# Patient Record
Sex: MALE | Race: WHITE | Employment: PART TIME | URBAN - NONMETROPOLITAN AREA
[De-identification: names, ages, dates, MRNs, and addresses within clinical notes are randomized per-mention and may not be internally consistent; named-entity substitution may affect disease eponyms.]

---

## 2023-01-28 ENCOUNTER — HOSPITAL ENCOUNTER (EMERGENCY)
Age: 28
Discharge: HOME | End: 2023-01-28
Attending: EMERGENCY MEDICINE
Payer: COMMERCIAL

## 2023-01-28 VITALS
OXYGEN SATURATION: 97 % | HEIGHT: 67 IN | BODY MASS INDEX: 25.9 KG/M2 | SYSTOLIC BLOOD PRESSURE: 130 MMHG | WEIGHT: 165 LBS | HEART RATE: 70 BPM | DIASTOLIC BLOOD PRESSURE: 75 MMHG | TEMPERATURE: 98.2 F | RESPIRATION RATE: 16 BRPM

## 2023-01-28 DIAGNOSIS — T50.905A ADVERSE EFFECT OF DRUG, INITIAL ENCOUNTER: Primary | ICD-10-CM

## 2023-01-28 DIAGNOSIS — F41.1 ANXIETY STATE: ICD-10-CM

## 2023-01-28 PROCEDURE — 6370000000 HC RX 637 (ALT 250 FOR IP): Performed by: EMERGENCY MEDICINE

## 2023-01-28 PROCEDURE — 99283 EMERGENCY DEPT VISIT LOW MDM: CPT

## 2023-01-28 RX ORDER — LORAZEPAM 1 MG/1
2 TABLET ORAL ONCE
Status: COMPLETED | OUTPATIENT
Start: 2023-01-28 | End: 2023-01-28

## 2023-01-28 RX ORDER — ESCITALOPRAM OXALATE 10 MG/1
10 TABLET ORAL DAILY
COMMUNITY
Start: 2023-01-25 | End: 2023-01-28 | Stop reason: SINTOL

## 2023-01-28 RX ORDER — HYDROXYZINE PAMOATE 50 MG/1
50 CAPSULE ORAL 3 TIMES DAILY PRN
Qty: 20 CAPSULE | Refills: 0 | Status: SHIPPED | OUTPATIENT
Start: 2023-01-28 | End: 2023-02-11

## 2023-01-28 RX ADMIN — LORAZEPAM 2 MG: 1 TABLET ORAL at 20:57

## 2023-01-28 ASSESSMENT — PAIN - FUNCTIONAL ASSESSMENT: PAIN_FUNCTIONAL_ASSESSMENT: NONE - DENIES PAIN

## 2023-01-29 NOTE — ED PROVIDER NOTES
CHIEF COMPLAINT    Chief Complaint   Patient presents with    Anxiety     HPI  Juju Fisher is a 32 y.o. male with history of anxiety who presents to the ED with complaints of anxiety and concern for possible medication reaction. Patient states he was just prescribed escitalopram.  Today is the third day of taking 10 mg and he states that he took his prescribed dose at 4 PM and approximately 1 hour prior to arrival he was driving when he began to have a flushing sensation in his lower abdomen that went to his chest followed by feeling very anxious. He felt that his vision was very \"intense. \"  Patient subsequently began to experience tremors and felt like his mouth was dry with difficulty speaking. Also noticed some tremors. He has experienced somewhat similar symptoms with anxiety attacks in the past but states never to this extent. Symptoms are rated as moderate in severity. Nothing seems to make them better or worse. He denies headache, chest pain, shortness of breath, nausea, vomiting      REVIEW OF SYSTEMS  Constitutional: No fever, chills or recent illness. Eye: No visual changes  HENT: No earache or sore throat. Resp: No SOB or productive cough. Cardio: No chest pain or palpitations. GI: No abdominal pain, nausea, vomiting, constipation or diarrhea. No melena. : No dysuria, urgency or frequency. Endocrine: No heat intolerance, no cold intolerance, no polydipsia   Lymphatics: No adenopathy  Musculoskeletal: No new muscle aches or joint pain. Neuro: No headaches. Psych: No homicidal or suicidal thoughts. Complains of anxiety  Skin: No rash, No itching. ?  ? PAST MEDICAL HISTORY  Past Medical History:   Diagnosis Date    Anxiety      FAMILY HISTORY  No family history on file.   SOCIAL HISTORY  Social History     Socioeconomic History    Marital status:        SURGICAL HISTORY  Past Surgical History:   Procedure Laterality Date    TOOTH EXTRACTION       CURRENT MEDICATIONS  Previous Medications    No medications on file     ALLERGIES  Not on File    Nursing notes reviewed by myself for past medical history, family history, social history, surgical history, current medications, and allergies. PHYSICAL EXAM  VITAL SIGNS: Triage VS:    ED Triage Vitals   Enc Vitals Group      BP       Pulse       Resp       Temp       Temp src       SpO2       Weight       Height       Head Circumference       Peak Flow       Pain Score       Pain Loc       Pain Edu? Excl. in [address]? Constitutional: Well developed, Well nourished, nontoxic appearing  HENT: Normocephalic, Atraumatic, Bilateral external ears normal, Oropharynx moist, No oral exudates, Nose normal.   Eyes: PERRL, EOMI, Conjunctiva normal, No discharge. No scleral icterus. Neck: Normal range of motion, No tenderness, Supple. Lymphatic: No lymphadenopathy noted. Cardiovascular: Normal heart rate, Normal rhythm, No murmurs, gallops or rubs. Thorax & Lungs: Normal breath sounds, No respiratory distress, No wheezing. Abdomen: Soft, No tenderness, No masses, No pulsatile masses, No distention, Normal bowel sounds  Skin: Warm, Dry, Pink, No mottling, No erythema, No rash. Back: No tenderness, No CVA tenderness. Extremities: No edema, No tenderness, No cyanosis, Normal perfusion, No clubbing. Musculoskeletal: Good range of motion in all major joints as observed. No major deformities noted. Neurologic: Alert & oriented x 3, Normal motor function, Normal sensory function, CN II-XII grossly intact as tested, No focal deficits noted. Ambulates without difficulty. No clonus  Psychiatric: Anxious, cooperative      RADIOLOGY  Labs Reviewed - No data to display  I personally reviewed the images.  The radiologist's interpretation reveals:  Last Imaging results   No orders to display         MEDS GIVEN IN ED:  Medications   LORazepam (ATIVAN) tablet 2 mg (2 mg Oral Given 1/28/23 2057)     COURSE & MEDICAL DECISION MAKING  58-year-old male presents emergency department complaints of anxiety and concern for medication reaction to his escitalopram which she was started on 3 days ago. Initial vital signs demonstrate elevated blood pressure of 141/90. He is without tachycardia or hyperthermia. On exam he does appear anxious. Neurological exam is otherwise nonfocal.  It seems that patient symptoms began within a few hours of taking his escitalopram with symptoms described as flushing, anxiety, tremors. He has no signs of clonus on exam and is otherwise cooperative. At this time we will provide him with a dose of Ativan to see if this will improve his symptoms as he may be hypersensitive to escitalopram or experiencing panic attack in general.    On reassessment the patient's symptoms have resolved. At this time I instructed him to stop taking the escitalopram given he has only been on the medication for 3 days. He voiced understanding. A prescription for Vistaril was sent to pharmacy to use as needed for anxiety. We discussed speaking with his primary provider about different medication options for ongoing anxiety treatment. Return precautions provided. Amount and/or Complexity of Data Reviewed  Clinical lab tests: reviewed  Decide to obtain previous medical records or to obtain history from someone other than the patient: yes       -  Patient seen and evaluated in the emergency department. -  Triage and nursing notes reviewed and incorporated. -  Old chart records reviewed and incorporated. -  Work-up included:  See above      Appropriate PPE utilized as indicated for entire patient encounter? Time of Disposition: See timeline  ? New Prescriptions    HYDROXYZINE PAMOATE (VISTARIL) 50 MG CAPSULE    Take 1 capsule by mouth 3 times daily as needed for Anxiety     FINAL IMPRESSION  1. Adverse effect of drug, initial encounter    2. Anxiety state        Electronically signed by:  [address], DO, 1/28/2023         [address], DO  01/28/23 2153

## 2023-01-29 NOTE — ED TRIAGE NOTES
Pt was placed on Lexapro a few days ago and had a \"full blown worse anxiety attack I have ever had today while in a fast food drive thru\". Pt states he has a hx of anxiety and darrius is why his pcp provided him the Lexapro.

## 2023-01-29 NOTE — ED NOTES
Discharge instructions and prescription reviewed with pt and verbalizes understanding.      Cecilia Butler RN  01/28/23 5893

## 2023-02-11 ENCOUNTER — HOSPITAL ENCOUNTER (EMERGENCY)
Age: 28
Discharge: HOME | End: 2023-02-11
Attending: EMERGENCY MEDICINE
Payer: COMMERCIAL

## 2023-02-11 VITALS
TEMPERATURE: 97 F | HEART RATE: 64 BPM | BODY MASS INDEX: 25.01 KG/M2 | OXYGEN SATURATION: 100 % | SYSTOLIC BLOOD PRESSURE: 141 MMHG | WEIGHT: 165 LBS | DIASTOLIC BLOOD PRESSURE: 77 MMHG | HEIGHT: 68 IN | RESPIRATION RATE: 16 BRPM

## 2023-02-11 DIAGNOSIS — B35.9 RINGWORM: Primary | ICD-10-CM

## 2023-02-11 PROCEDURE — 99283 EMERGENCY DEPT VISIT LOW MDM: CPT

## 2023-02-11 RX ORDER — CLOTRIMAZOLE 1 %
CREAM (GRAM) TOPICAL
Qty: 12 G | Refills: 0 | Status: SHIPPED | OUTPATIENT
Start: 2023-02-11 | End: 2023-02-17

## 2023-02-11 ASSESSMENT — PAIN - FUNCTIONAL ASSESSMENT: PAIN_FUNCTIONAL_ASSESSMENT: 0-10

## 2023-02-11 ASSESSMENT — PAIN SCALES - GENERAL: PAINLEVEL_OUTOF10: 3

## 2023-02-11 ASSESSMENT — PAIN DESCRIPTION - LOCATION: LOCATION: KNEE;ANKLE

## 2023-02-11 NOTE — ED PROVIDER NOTES
[facility] [address]      Pt Name: Iman Zapata  MRN: [id number]  [place] 1995  Date of evaluation: 2/11/2023  Provider: Laury Lund MD  Insurance:  Payor: Shiela Rusk / Plan: [place] / Product Type: *No Product type* /   Current Code Status   Code Status: Not on file     CHIEF COMPLAINT       Chief Complaint   Patient presents with    Rash     Rash for around a week on L shin. Pt denies itching but says joints on L ankle and L knee are aching as well. HISTORY OF PRESENT ILLNESS    HPI    Nursing Notes were reviewed. This is a 32 y.o. male who presents to the emergency department with a rash on his left lower leg. The patient says his wife noticed the lesion approximately week ago, however he only recently noticed it in the past 24 to 48 hours. He denies trauma to the area. He denies pruritus. He denies fevers. PAST MEDICAL HISTORY     Past Medical History:   Diagnosis Date    Anxiety          SURGICAL HISTORY       Past Surgical History:   Procedure Laterality Date    TOOTH EXTRACTION           CURRENT MEDICATIONS       Discharge Medication List as of 2/11/2023  4:51 PM        CONTINUE these medications which have NOT CHANGED    Details   hydrOXYzine pamoate (VISTARIL) 50 MG capsule Take 1 capsule by mouth 3 times daily as needed for Anxiety, Disp-20 capsule, R-0Normal             ALLERGIES     Shellfish-derived products    FAMILY HISTORY     No family history on file. SOCIAL HISTORY       Social History     Socioeconomic History    Marital status:        PHYSICAL EXAM       ED Triage Vitals [02/11/23 1631]   BP Temp Temp Source Heart Rate Resp SpO2 Height Weight   (!) 141/77 (!) 96.4 °F (35.8 °C) Oral 64 16 100 % 5' 8\" (1.727 m) 165 lb (74.8 kg)       Physical Exam  Vitals and nursing note reviewed. Constitutional:       Appearance: Normal appearance. HENT:      Head: Normocephalic.       Mouth/Throat: Mouth: Mucous membranes are moist.   Cardiovascular:      Rate and Rhythm: Normal rate. Pulmonary:      Effort: Pulmonary effort is normal.   Skin:     General: Skin is warm and dry. Findings: Rash present. Rash is crusting. Neurological:      Mental Status: He is alert. Vitals:    Vitals:    02/11/23 1631 02/11/23 1645   BP: (!) 141/77    Pulse: 64    Resp: 16    Temp: (!) 96.4 °F (35.8 °C) 97 °F (36.1 °C)   TempSrc: Oral    SpO2: 100%    Weight: 165 lb (74.8 kg)    Height: 5' 8\" (1.727 m)      MEDICAL DECISION MAKING   Medications - No data to display          [place] Coma Scale  Eye Opening: Spontaneous  Best Verbal Response: Oriented  Best Motor Response: Obeys commands  [place] Coma Scale Score: 15                     CIWA Assessment  BP: (!) 141/77  Heart Rate: 64                 MDM  This is a 32 y.o. male who presents to the emergency department with a rash on his left lower leg. The patient says his wife noticed the lesion approximately week ago, however he only recently noticed it in the past 24 to 48 hours. He denies trauma to the area. He denies pruritus. He denies fevers. Clinical exam indicates findings consistent with tinea corporis/ringworm. There is no evidence of cellulitis. There is no erythema, induration or other signs of extending infection. There are no systemic infectious signs. There is no purpura indicating hematological disorder. The patient has acute tinea corporis    The patient has been given antifungal topical medication instructions to follow-up with his primary care physician as soon as possible    Clinical Diagnoses Addressed  1. Ringworm            CONSULTS:  None    PROCEDURES:  Unless otherwise noted below, none     Procedures      FINAL IMPRESSION      1.  Ringworm          DISPOSITION/PLAN   DISPOSITION Decision To Discharge 02/11/2023 04:46:23 PM      PATIENT REFERRED TO:      Call in 2 days  Follow up within 3 days, Return to ED sooner if symptoms worsen    DISCHARGE MEDICATIONS:  Discharge Medication List as of 2/11/2023  4:51 PM        START taking these medications    Details   clotrimazole (LOTRIMIN) 1 % cream Apply topically 3 times daily to lesions on skin., Disp-12 g, R-0, Normal           Controlled Substances Monitoring:     No flowsheet data found.     Nena Galarza MD (electronically signed)  Attending Emergency Physician            Nena Galarza MD  02/11/23 9434

## 2023-02-11 NOTE — Clinical Note
Harry Blankenship was seen and treated in our emergency department on 2/11/2023. He may return to work on 02/12/2023. If you have any questions or concerns, please don't hesitate to call.       Jennifer Peterson MD

## 2023-03-23 ENCOUNTER — HOSPITAL ENCOUNTER (EMERGENCY)
Age: 28
Discharge: HOME | End: 2023-03-23
Attending: STUDENT IN AN ORGANIZED HEALTH CARE EDUCATION/TRAINING PROGRAM
Payer: MEDICAID

## 2023-03-23 ENCOUNTER — APPOINTMENT (OUTPATIENT)
Dept: CT IMAGING | Age: 28
End: 2023-03-23
Payer: MEDICAID

## 2023-03-23 ENCOUNTER — APPOINTMENT (OUTPATIENT)
Dept: GENERAL RADIOLOGY | Age: 28
End: 2023-03-23
Payer: MEDICAID

## 2023-03-23 DIAGNOSIS — R52 BODY ACHES: ICD-10-CM

## 2023-03-23 DIAGNOSIS — J06.9 VIRAL URI: ICD-10-CM

## 2023-03-23 DIAGNOSIS — J02.9 VIRAL PHARYNGITIS: Primary | ICD-10-CM

## 2023-03-23 LAB
ANION GAP SERPL CALCULATED.3IONS-SCNC: 12 MMOL/L (ref 4–16)
BASOPHILS ABSOLUTE: 0.1 K/CU MM
BASOPHILS RELATIVE PERCENT: 0.6 % (ref 0–1)
BILIRUBIN URINE: NEGATIVE MG/DL
BLOOD, URINE: NEGATIVE
BUN SERPL-MCNC: 14 MG/DL (ref 6–23)
CALCIUM SERPL-MCNC: 10.2 MG/DL (ref 8.3–10.6)
CHLORIDE BLD-SCNC: 101 MMOL/L (ref 99–110)
CLARITY: CLEAR
CO2: 26 MMOL/L (ref 21–32)
COLOR: YELLOW
COMMENT UA: NORMAL
CREAT SERPL-MCNC: 1 MG/DL (ref 0.9–1.3)
DIFFERENTIAL TYPE: ABNORMAL
EOSINOPHILS ABSOLUTE: 0 K/CU MM
EOSINOPHILS RELATIVE PERCENT: 0.3 % (ref 0–3)
GFR SERPL CREATININE-BSD FRML MDRD: >60 ML/MIN/1.73M2
GLUCOSE SERPL-MCNC: 95 MG/DL (ref 70–99)
GLUCOSE, URINE: NEGATIVE MG/DL
HCT VFR BLD CALC: 44.2 % (ref 42–52)
HEMOGLOBIN: 15.2 GM/DL (ref 13.5–18)
IMMATURE NEUTROPHIL %: 0.4 % (ref 0–0.43)
KETONES, URINE: NEGATIVE MG/DL
LEUKOCYTE ESTERASE, URINE: NEGATIVE
LYMPHOCYTES ABSOLUTE: 0.7 K/CU MM
LYMPHOCYTES RELATIVE PERCENT: 5.3 % (ref 24–44)
MCH RBC QN AUTO: 30 PG (ref 27–31)
MCHC RBC AUTO-ENTMCNC: 34.4 % (ref 32–36)
MCV RBC AUTO: 87.4 FL (ref 78–100)
MONOCYTES ABSOLUTE: 0.7 K/CU MM
MONOCYTES RELATIVE PERCENT: 4.8 % (ref 0–4)
NITRITE URINE, QUANTITATIVE: NEGATIVE
PDW BLD-RTO: 11.8 % (ref 11.7–14.9)
PH, URINE: 6.5 (ref 5–8)
PLATELET # BLD: 273 K/CU MM (ref 140–440)
PMV BLD AUTO: 8.1 FL (ref 7.5–11.1)
POTASSIUM SERPL-SCNC: 4 MMOL/L (ref 3.5–5.1)
PROTEIN UA: NEGATIVE MG/DL
RAPID INFLUENZA  B AGN: NEGATIVE
RAPID INFLUENZA A AGN: NEGATIVE
RBC # BLD: 5.06 M/CU MM (ref 4.6–6.2)
SARS-COV-2 RDRP RESP QL NAA+PROBE: NOT DETECTED
SEGMENTED NEUTROPHILS ABSOLUTE COUNT: 12.3 K/CU MM
SEGMENTED NEUTROPHILS RELATIVE PERCENT: 88.6 % (ref 36–66)
SODIUM BLD-SCNC: 139 MMOL/L (ref 135–145)
SPECIFIC GRAVITY UA: 1.02 (ref 1–1.03)
TOTAL IMMATURE NEUTOROPHIL: 0.05 K/CU MM
TROPONIN T: <0.01 NG/ML
UROBILINOGEN, URINE: 0.2 MG/DL (ref 0.2–1)
WBC # BLD: 13.9 K/CU MM (ref 4–10.5)

## 2023-03-23 PROCEDURE — 87804 INFLUENZA ASSAY W/OPTIC: CPT

## 2023-03-23 PROCEDURE — 74176 CT ABD & PELVIS W/O CONTRAST: CPT

## 2023-03-23 PROCEDURE — 85025 COMPLETE CBC W/AUTO DIFF WBC: CPT

## 2023-03-23 PROCEDURE — 84484 ASSAY OF TROPONIN QUANT: CPT

## 2023-03-23 PROCEDURE — 93005 ELECTROCARDIOGRAM TRACING: CPT | Performed by: STUDENT IN AN ORGANIZED HEALTH CARE EDUCATION/TRAINING PROGRAM

## 2023-03-23 PROCEDURE — 87077 CULTURE AEROBIC IDENTIFY: CPT

## 2023-03-23 PROCEDURE — 96374 THER/PROPH/DIAG INJ IV PUSH: CPT

## 2023-03-23 PROCEDURE — 99285 EMERGENCY DEPT VISIT HI MDM: CPT

## 2023-03-23 PROCEDURE — 2580000003 HC RX 258: Performed by: EMERGENCY MEDICINE

## 2023-03-23 PROCEDURE — 87081 CULTURE SCREEN ONLY: CPT

## 2023-03-23 PROCEDURE — 87635 SARS-COV-2 COVID-19 AMP PRB: CPT

## 2023-03-23 PROCEDURE — 6360000002 HC RX W HCPCS: Performed by: EMERGENCY MEDICINE

## 2023-03-23 PROCEDURE — 80048 BASIC METABOLIC PNL TOTAL CA: CPT

## 2023-03-23 PROCEDURE — 71045 X-RAY EXAM CHEST 1 VIEW: CPT

## 2023-03-23 PROCEDURE — 87430 STREP A AG IA: CPT

## 2023-03-23 PROCEDURE — 81003 URINALYSIS AUTO W/O SCOPE: CPT

## 2023-03-23 RX ORDER — KETOROLAC TROMETHAMINE 15 MG/ML
15 INJECTION, SOLUTION INTRAMUSCULAR; INTRAVENOUS ONCE
Status: COMPLETED | OUTPATIENT
Start: 2023-03-23 | End: 2023-03-23

## 2023-03-23 RX ORDER — 0.9 % SODIUM CHLORIDE 0.9 %
1000 INTRAVENOUS SOLUTION INTRAVENOUS ONCE
Status: COMPLETED | OUTPATIENT
Start: 2023-03-23 | End: 2023-03-23

## 2023-03-23 RX ADMIN — KETOROLAC TROMETHAMINE 15 MG: 15 INJECTION, SOLUTION INTRAMUSCULAR; INTRAVENOUS at 20:52

## 2023-03-23 RX ADMIN — SODIUM CHLORIDE 1000 ML: 9 INJECTION, SOLUTION INTRAVENOUS at 21:18

## 2023-03-23 ASSESSMENT — PAIN DESCRIPTION - LOCATION
LOCATION: GENERALIZED
LOCATION: FLANK
LOCATION: GENERALIZED;THROAT
LOCATION: GENERALIZED

## 2023-03-23 ASSESSMENT — PAIN DESCRIPTION - DESCRIPTORS
DESCRIPTORS: ACHING
DESCRIPTORS: ACHING

## 2023-03-23 ASSESSMENT — PAIN - FUNCTIONAL ASSESSMENT: PAIN_FUNCTIONAL_ASSESSMENT: 0-10

## 2023-03-23 ASSESSMENT — PAIN SCALES - GENERAL
PAINLEVEL_OUTOF10: 6
PAINLEVEL_OUTOF10: 10
PAINLEVEL_OUTOF10: 10

## 2023-03-23 ASSESSMENT — PAIN DESCRIPTION - ORIENTATION
ORIENTATION: RIGHT
ORIENTATION: RIGHT

## 2023-03-23 NOTE — Clinical Note
Jahaira Desai was seen and treated in our emergency department on 3/23/2023. He may return to work on 03/27/2023. If you have any questions or concerns, please don't hesitate to call.       [id number] [facility], DO

## 2023-03-23 NOTE — ED PROVIDER NOTES
NOT DETECTED       Radiographs (if obtained):  Radiologist's Report Reviewed:  No results found. EKG (if obtained): (All EKG's are interpreted by myself in the absence of a cardiologist)    MDM:    History source:  patient  History Limitations: none    22-year-old male who presents to the ED with a history of cellulitis of the toes for the past couple of days. Patient also endorses a history of ill contacts, stating that some members of his family, his children, have had sore throats in the past couple of days. No history of chest pain, shortness of breath, abdominal pain, back pain, focal neurological deficit, fever, urinary symptoms, headache, neck pan/stiffness, trauma, visual changes, rashes or joint pain. Margaret Leung Physical examination is significant for mild pharyngeal erythema without exudates. Significant differentials considered at this time include   -Pharyngitis viral versus bacterial, upper respiratory infection. Previous notes reviewed:  -None    EKG   None at this time    Laboratory evaluations include[de-identified]  Considered: Rapid strep, rapid flu, COVID  Done: Same  Significant results: Unremarkable        Radiology include:  Considered : none  Done: None  Significant results: Not applicable      Medications:       Procedures:    Procedures    Consults:  -None    Social Determinants affecting management or disposition:    -None    Reevaluation  Patient has unremarkable labs and is scheduled for discharge with outpatient follow-up and supportive management. 7:00 PM  At this point patient started to complain that he has increased weakness, chest pain or shortness of breath. Patient also complains of right flank pain and has concerned that he may be having a kidney infection or stone. Disposition  Considered: Discharge  Final disposition: Patient care endorsed to the night team    22-year-old male presented to the ED days of soreness of the throat for the past couple of days.   Patient physical examination is significant for  mild pharyngeal erythema without exudate. Patient also has unremarkable laboratory evaluation in the ED including rapid strep, rapid flu and COVID test.  Patient is scheduled for discharge when patient started to complain of shortness of breath, chest pain, worsening soreness of the throat as well as flank pain. This point patient's discharge is discontinued and patient is scheduled for more evaluation. Blood is  drawn for labs and patient scheduled for chest x-ray, EKG, CT scan. Patient care endorsed to the night team      Clinical Impression:    1. Viral pharyngitis          Comment: Please note this report has been produced using speech recognition software and may contain errors related to that system including errors in grammar, punctuation, and spelling, as well as words and phrases that may be inappropriate. Efforts were made to edit the dictations.          [address] [place] [address],[address], DO  03/25/23 4473

## 2023-03-23 NOTE — ED NOTES
Patient discharging home, AVS reviewed with no questions at this time. Patient instrcuted to follow up per discharge instructions and to return for worsening symptoms. Respirations equal and unlabored, skin PWD.        Yamil Vazquez RN  03/23/23 9796

## 2023-03-23 NOTE — DISCHARGE INSTRUCTIONS
Follow-up with primary care as soon as possible  Take OTC medications today for pain  Ensure adequate hydration and healthy nutrition  Return to the ED symptoms persist or worsen or if you have any other symptom of concern including chest pain, shortness of breath, lightheadedness/dizziness or focal neurologic deficits.

## 2023-03-24 VITALS
WEIGHT: 165 LBS | RESPIRATION RATE: 16 BRPM | SYSTOLIC BLOOD PRESSURE: 136 MMHG | BODY MASS INDEX: 25.9 KG/M2 | OXYGEN SATURATION: 100 % | DIASTOLIC BLOOD PRESSURE: 75 MMHG | TEMPERATURE: 100.8 F | HEIGHT: 67 IN | HEART RATE: 97 BPM

## 2023-03-24 LAB
EKG ATRIAL RATE: 94 BPM
EKG DIAGNOSIS: NORMAL
EKG P AXIS: 63 DEGREES
EKG P-R INTERVAL: 130 MS
EKG Q-T INTERVAL: 320 MS
EKG QRS DURATION: 88 MS
EKG QTC CALCULATION (BAZETT): 400 MS
EKG R AXIS: 54 DEGREES
EKG T AXIS: 61 DEGREES
EKG VENTRICULAR RATE: 94 BPM

## 2023-03-24 PROCEDURE — 93010 ELECTROCARDIOGRAM REPORT: CPT | Performed by: INTERNAL MEDICINE

## 2023-03-24 NOTE — ED NOTES
Pt is in Lt fetal position. He will not answer questions but follows commands.       Ariana Castanon, RN  03/24/23 9032

## 2023-03-24 NOTE — ED NOTES
Report received from [facility].       Beverley Araujo RN  03/24/23 9884

## 2023-03-24 NOTE — ED PROVIDER NOTES
8:02 PM  Care of patient is transferred from Dr. Holger Xiong to me. Patient initially presented with nausea, generalized body aches and pharyngitis. Patient did have a negative work-up initially, and was about to be discharged home. Previous physician states that upon discharge home patient began complaining of chest pain, shortness of breath and left flank pain. Work-up is broadened at that point. Plan to follow-up on patient's new labs and imaging and disposition patient appropriately. EKG - normal sinus rhythm, rate 94, , QRS 88, QTc 400, no acute ST elevation. No previous EKG to compare to. Labs reviewed:  Patient with mild leukocytosis of 13.9, hemoglobin and platelets stable. Electrolytes, BUN, creatinine within acceptable limits. Urine without signs of infection. Initial troponin not detected. Strep throat negative. Influenza both a and B negative. COVID not detected. Imaging reviewed:  X-ray with no acute cardiopulmonary disease. CT of the abdomen pelvis without contrast.  No acute finding in abdomen or pelvis. There is a punctate nonobstructing left renal calculus. No acute obstructive uropathy. Heart score-0, slightly suspicious history, normal EKG, age less than 39, no known risk factors, initial troponin 0. MDM:  This is a 26-year-old male who presents initially with nausea, generalized body aches and pharyngitis. He was about to be discharged when he had further complaints of shortness of breath, left flank pain and chest pain which prompted previous physician to broaden work-up and patient's care was transferred to myself. Differential diagnosis includes viral URI, pneumonia, kidney stone. Labs and imaging were obtained and reviewed. Patient's lab abnormalities were significant for mild leukocytosis of 13.9, otherwise unremarkable. Chest x-ray was negative, CT abdomen pelvis without acute process. EKG did not have any acute findings. Patient's heart score is 0. Patient did have mild fever here in the emergency department, 100.8 Fahrenheit. Patient most likely with viral URI. Patient is otherwise not tachycardic, SPO2 is 98% on room air, not tachypneic, with blood pressure within acceptable limits. Do think he is appropriate for outpatient follow-up and supportive care. All labs and imaging discussed with patient. Plan of care, expectant management is discussed with patient. He is to get plenty of rest, drink fluids, take Tylenol and ibuprofen as needed for body aches and fever. He is to follow-up with his primary care provider in 1 to 2 days. He is to return to the emergency department if symptoms worsen. Return precautions are discussed and he does verbalize understanding of these. All questions were answered and he is agreeable with plan of care. 11:09 PM   Patient states that he is feeling better, his back pain has improved, and he does feel ready for discharge home.        [id number] [facility],   03/23/23 2851

## 2023-03-26 LAB
CULTURE: ABNORMAL
CULTURE: ABNORMAL
Lab: ABNORMAL
SPECIMEN: ABNORMAL
STREP A DIRECT SCREEN: NEGATIVE